# Patient Record
Sex: FEMALE | Race: WHITE | Employment: STUDENT | URBAN - NONMETROPOLITAN AREA
[De-identification: names, ages, dates, MRNs, and addresses within clinical notes are randomized per-mention and may not be internally consistent; named-entity substitution may affect disease eponyms.]

---
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
## 2024-01-16 ENCOUNTER — OFFICE VISIT (OUTPATIENT)
Age: 19
End: 2024-01-16

## 2024-01-16 VITALS
SYSTOLIC BLOOD PRESSURE: 127 MMHG | WEIGHT: 214 LBS | DIASTOLIC BLOOD PRESSURE: 82 MMHG | HEART RATE: 81 BPM | TEMPERATURE: 98.4 F | OXYGEN SATURATION: 98 %

## 2024-01-16 DIAGNOSIS — H65.112 ACUTE ALLERGIC SEROUS OTITIS MEDIA OF LEFT EAR: Primary | ICD-10-CM

## 2024-01-16 PROCEDURE — 99999 PR OFFICE/OUTPT VISIT,PROCEDURE ONLY: CPT | Performed by: NURSE PRACTITIONER

## 2024-01-16 ASSESSMENT — ENCOUNTER SYMPTOMS
EYES NEGATIVE: 1
RESPIRATORY NEGATIVE: 1
GASTROINTESTINAL NEGATIVE: 1

## 2024-01-16 NOTE — PROGRESS NOTES
[facility] PHYSICIANS [facility]  [address] [id number]  Dept: [phone]  Dept Fax: [phone]    Javier Madrid is a 18 y.o. femalewho presents to the [facility] today for c/o of Otalgia (Left/)      HPI:     18-year-old female presents to [facility] clinic today with complaints of left ear pain, ringing in left ear for 3 days.  States it comes and goes.  Cold weather makes it worse.  She denies fever.  Denies trauma to the left ear.  Denies drainage.  She reports having seasonal allergies.  She states she is taking a daily allergy medication.        No past medical history on file.    Current Outpatient Medications   Medication Sig Dispense Refill    cetirizine (ZYRTEC ALLERGY) 10 MG tablet daily      albuterol sulfate HFA (PROVENTIL;VENTOLIN;PROAIR) 108 (90 Base) MCG/ACT inhaler INHALE 1-2 puffs EVERY 4 TO 6 HOURS AS NEEDED      LO-ZUMANDIMINE 3-0.02 MG per tablet Take 1 tablet by mouth daily      FLUoxetine (PROZAC) 20 MG capsule Take 1 capsule by mouth daily      VITAMIN D PO Take by mouth daily       No current facility-administered medications for this visit.     No Known Allergies    :     Review of Systems   Constitutional: Negative.  Negative for chills and fever.   HENT:  Positive for ear pain. Negative for ear discharge.    Eyes: Negative.    Respiratory: Negative.     Cardiovascular: Negative.    Gastrointestinal: Negative.    Genitourinary: Negative.    Musculoskeletal: Negative.    Skin: Negative.    Neurological: Negative.    Psychiatric/Behavioral: Negative.         :     Physical Exam  Vitals and nursing note reviewed.   Constitutional:       General: She is not in acute distress.     Appearance: She is well-developed. She is not ill-appearing or toxic-appearing.   HENT:      Head: Normocephalic and atraumatic.      Right Ear: Tympanic membrane, ear canal and external

## 2024-02-27 ENCOUNTER — OFFICE VISIT (OUTPATIENT)
Age: 19
End: 2024-02-27

## 2024-02-27 VITALS
WEIGHT: 218 LBS | HEART RATE: 95 BPM | SYSTOLIC BLOOD PRESSURE: 130 MMHG | DIASTOLIC BLOOD PRESSURE: 87 MMHG | OXYGEN SATURATION: 99 % | TEMPERATURE: 99 F

## 2024-02-27 DIAGNOSIS — R68.83 CHILLS: Primary | ICD-10-CM

## 2024-02-27 DIAGNOSIS — J30.2 SEASONAL ALLERGIES: ICD-10-CM

## 2024-02-27 LAB
INFLUENZA A ANTIBODY: NEGATIVE
INFLUENZA B ANTIBODY: NEGATIVE
SARS-COV-2: NEGATIVE

## 2024-02-27 ASSESSMENT — ENCOUNTER SYMPTOMS
SORE THROAT: 0
COUGH: 0
SHORTNESS OF BREATH: 0
NAUSEA: 1
CHEST TIGHTNESS: 0
WHEEZING: 0

## 2024-02-27 NOTE — PROGRESS NOTES
[facility] PHYSICIANS [facility]  [address] [address]  Dept: [phone]  Dept Fax: [phone]    Javier Madrid is a 18 y.o. femalewho presents to the [facility] today for c/o of Chills      HPI:     18-year-old female presents to [facility] today with complaints of 1 day of chills, hot flashes, headache, sneezing, feeling tired, nausea.  She states she had taken Tylenol for headache with relief.  She denies known sick contacts.  She denies neck pain, ear pain, sore throat, vomiting or diarrhea.  Denies rash.        No past medical history on file.    Current Outpatient Medications   Medication Sig Dispense Refill    cetirizine (ZYRTEC ALLERGY) 10 MG tablet daily      LO-ZUMANDIMINE 3-0.02 MG per tablet Take 1 tablet by mouth daily      FLUoxetine (PROZAC) 20 MG capsule Take 1 capsule by mouth daily      albuterol sulfate HFA (PROVENTIL;VENTOLIN;PROAIR) 108 (90 Base) MCG/ACT inhaler INHALE 1-2 puffs EVERY 4 TO 6 HOURS AS NEEDED (Patient not taking: Reported on 2/27/2024)      VITAMIN D PO Take by mouth daily       No current facility-administered medications for this visit.     No Known Allergies    :     Review of Systems   Constitutional:  Positive for chills. Negative for activity change and fever.   HENT:  Positive for sneezing. Negative for ear pain and sore throat.    Respiratory:  Negative for cough, chest tightness, shortness of breath and wheezing.    Cardiovascular:  Negative for chest pain.   Gastrointestinal:  Positive for nausea.   Skin:  Negative for rash.   Neurological:  Positive for headaches.       :     Physical Exam  Vitals and nursing note reviewed.   Constitutional:       General: She is not in acute distress.     Appearance: She is not ill-appearing, toxic-appearing or diaphoretic.   HENT:      Head: Normocephalic.      Right Ear: Tympanic membrane normal.

## 2024-10-10 ENCOUNTER — HOSPITAL ENCOUNTER (EMERGENCY)
Age: 19
Discharge: HOME | End: 2024-10-11
Attending: EMERGENCY MEDICINE
Payer: COMMERCIAL

## 2024-10-10 VITALS
RESPIRATION RATE: 22 BRPM | BODY MASS INDEX: 37.56 KG/M2 | WEIGHT: 220 LBS | OXYGEN SATURATION: 99 % | HEART RATE: 82 BPM | DIASTOLIC BLOOD PRESSURE: 97 MMHG | SYSTOLIC BLOOD PRESSURE: 157 MMHG | TEMPERATURE: 98.1 F | HEIGHT: 64 IN

## 2024-10-10 DIAGNOSIS — T78.3XXA ANGIOEDEMA, INITIAL ENCOUNTER: Primary | ICD-10-CM

## 2024-10-10 PROCEDURE — 6370000000 HC RX 637 (ALT 250 FOR IP): Performed by: EMERGENCY MEDICINE

## 2024-10-10 PROCEDURE — 99283 EMERGENCY DEPT VISIT LOW MDM: CPT

## 2024-10-10 RX ORDER — PREDNISONE 20 MG/1
40 TABLET ORAL DAILY
Qty: 6 TABLET | Refills: 0 | Status: SHIPPED | OUTPATIENT
Start: 2024-10-10 | End: 2024-10-13

## 2024-10-10 RX ORDER — FAMOTIDINE 20 MG/1
20 TABLET, FILM COATED ORAL ONCE
Status: COMPLETED | OUTPATIENT
Start: 2024-10-10 | End: 2024-10-10

## 2024-10-10 RX ORDER — PREDNISONE 20 MG/1
40 TABLET ORAL ONCE
Status: COMPLETED | OUTPATIENT
Start: 2024-10-10 | End: 2024-10-10

## 2024-10-10 RX ORDER — FAMOTIDINE 20 MG/1
20 TABLET, FILM COATED ORAL 2 TIMES DAILY
Qty: 6 TABLET | Refills: 0 | Status: SHIPPED | OUTPATIENT
Start: 2024-10-10 | End: 2024-10-13

## 2024-10-10 RX ADMIN — PREDNISONE 40 MG: 20 TABLET ORAL at 23:15

## 2024-10-10 RX ADMIN — FAMOTIDINE 20 MG: 20 TABLET, FILM COATED ORAL at 23:16

## 2024-10-10 ASSESSMENT — LIFESTYLE VARIABLES
HOW MANY STANDARD DRINKS CONTAINING ALCOHOL DO YOU HAVE ON A TYPICAL DAY: PATIENT DOES NOT DRINK
HOW OFTEN DO YOU HAVE A DRINK CONTAINING ALCOHOL: NEVER

## 2024-10-10 ASSESSMENT — ENCOUNTER SYMPTOMS
VOMITING: 0
EYE DISCHARGE: 0
EYE PAIN: 0
COUGH: 0
FACIAL SWELLING: 1
CHEST TIGHTNESS: 0
SORE THROAT: 0
COLOR CHANGE: 0
BLOOD IN STOOL: 0
WHEEZING: 0
NAUSEA: 0
TROUBLE SWALLOWING: 0
BACK PAIN: 0
SHORTNESS OF BREATH: 0
SINUS PRESSURE: 0
RHINORRHEA: 0
ABDOMINAL PAIN: 0
DIARRHEA: 0
CONSTIPATION: 0
EYE REDNESS: 0

## 2024-10-10 ASSESSMENT — PAIN - FUNCTIONAL ASSESSMENT: PAIN_FUNCTIONAL_ASSESSMENT: NONE - DENIES PAIN

## 2024-10-11 NOTE — ED PROVIDER NOTES
REFERREDTO:  [facility] ED  [address] [address]  [phone]    If symptoms worsen      DISCHARGEMEDICATIONS:  New Prescriptions    FAMOTIDINE (PEPCID) 20 MG TABLET    Take 1 tablet by mouth 2 times daily for 3 days    PREDNISONE (DELTASONE) 20 MG TABLET    Take 2 tablets by mouth daily for 3 days       (Please note that portions of this note were completed with a voice recognition program.  Efforts were made to edit thedictations but occasionally words are mis-transcribed.)    Connor Kahn MD  Attending Emergency Physician                        Connor Kahn MD  10/10/24 8039

## 2024-11-05 ENCOUNTER — OFFICE VISIT (OUTPATIENT)
Age: 19
End: 2024-11-05

## 2024-11-05 VITALS — OXYGEN SATURATION: 98 % | DIASTOLIC BLOOD PRESSURE: 81 MMHG | SYSTOLIC BLOOD PRESSURE: 120 MMHG | TEMPERATURE: 98.7 F

## 2024-11-05 DIAGNOSIS — J06.9 UPPER RESPIRATORY TRACT INFECTION, UNSPECIFIED TYPE: ICD-10-CM

## 2024-11-05 DIAGNOSIS — R09.81 NASAL CONGESTION: Primary | ICD-10-CM

## 2024-11-05 LAB
INFLUENZA A ANTIBODY: NEGATIVE
INFLUENZA B ANTIBODY: NEGATIVE
SARS-COV-2: NEGATIVE

## 2024-11-05 ASSESSMENT — ENCOUNTER SYMPTOMS
NAUSEA: 1
SORE THROAT: 0
RHINORRHEA: 1
VOMITING: 0
EYES NEGATIVE: 1
RESPIRATORY NEGATIVE: 1

## 2024-11-05 NOTE — PROGRESS NOTES
nerve deficit.      Motor: No abnormal muscle tone.      Coordination: Coordination normal.   Psychiatric:         Behavior: Behavior normal.         Thought Content: Thought content normal.         Judgment: Judgment normal.       /81 (Site: Right Upper Arm, Position: Sitting)   Temp 98.7 °F (37.1 °C)   SpO2 98%     :      Diagnosis Orders   1. Nasal congestion  COVID-19    POCT Influenza A/B      2. Upper respiratory tract infection, unspecified type          Discussed with patient COVID-negative.  Influenza negative.  Symptoms more than likely viral in nature.  No signs of a bacterial infection today in clinic that would indicate the need for an antibiotic.  She can take dose appropriate  Sudafed 12HR 120mg twice daily (nasal decongestant)  Flonase 1 spray each nostril twice daily (nasal steroid)  Zyrtec 10mg Daily OR Claritin 10mg Daily (antihistamine)  Ibuprofen 3 times a day as needed (antiinflammatory)  Warm tea with 1tbsp honey (soothes the throat)  Increase water intake  Rest     If symptoms persist or worsen contact office or go to the emergency room for evaluation.   :      Return if symptoms worsen or fail to improve.    No orders of the defined types were placed in this encounter.       Javier received patient educational materials - see patient instructions.  Discussed use,benefit, and side effects of prescribed medications.  Treatment plan discussed atvisit. Continue routine health care follow up.     All patient questions answered.Pt voiced understanding.      Electronically signed by JIHAN Du CNP on 11/5/2024 at 11:52 AM

## 2025-03-13 ENCOUNTER — OFFICE VISIT (OUTPATIENT)
Age: 20
End: 2025-03-13

## 2025-03-13 VITALS
HEART RATE: 100 BPM | TEMPERATURE: 98.4 F | DIASTOLIC BLOOD PRESSURE: 82 MMHG | OXYGEN SATURATION: 98 % | SYSTOLIC BLOOD PRESSURE: 128 MMHG

## 2025-03-13 DIAGNOSIS — J02.9 PHARYNGITIS, UNSPECIFIED ETIOLOGY: ICD-10-CM

## 2025-03-13 DIAGNOSIS — J06.9 UPPER RESPIRATORY TRACT INFECTION, UNSPECIFIED TYPE: ICD-10-CM

## 2025-03-13 DIAGNOSIS — R09.81 NASAL CONGESTION: Primary | ICD-10-CM

## 2025-03-13 LAB
INFLUENZA A ANTIBODY: NEGATIVE
INFLUENZA B ANTIBODY: NEGATIVE
S PYO AG THROAT QL: ABNORMAL
SARS-COV-2: NEGATIVE

## 2025-03-13 ASSESSMENT — ENCOUNTER SYMPTOMS
SORE THROAT: 1
RESPIRATORY NEGATIVE: 1
GASTROINTESTINAL NEGATIVE: 1
RHINORRHEA: 1

## 2025-03-13 NOTE — PROGRESS NOTES
[facility] PHYSICIANS [facility]  [address] [id number]  Dept: [phone]  Dept Fax: [phone]    Javier Madrid is a 19 y.o. femalewho presents to the [facility] today for c/o of Nasal Congestion and Pharyngitis      HPI:     19-year-old female presents to [facility] clinic today with complaints of 4 days of runny nose, nasal congestion, sore throat and a feeling of fluid present ears.  She denies known fever.  She denies known sick contacts.  She denies difficulty swallowing.  She denies chest pain, shortness of breath, difficulty breathing or wheezing.  Denies abdominal pain.        No past medical history on file.    Current Outpatient Medications   Medication Sig Dispense Refill    fluticasone (VERAMYST) 27.5 MCG/SPRAY nasal spray 2 sprays by Each Nostril route daily      levocetirizine (XYZAL) 5 MG tablet Take 1 tablet by mouth nightly      albuterol sulfate HFA (PROVENTIL;VENTOLIN;PROAIR) 108 (90 Base) MCG/ACT inhaler       famotidine (PEPCID) 20 MG tablet Take 1 tablet by mouth 2 times daily for 3 days 6 tablet 0    amphetamine-dextroamphetamine (ADDERALL) 20 MG tablet Take 1 tablet by mouth daily. (Patient not taking: Reported on 3/13/2025)      ferrous sulfate (IRON 325) 325 (65 Fe) MG tablet Take 1 tablet by mouth daily (with breakfast) (Patient not taking: Reported on 3/13/2025)      cetirizine (ZYRTEC ALLERGY) 10 MG tablet daily (Patient not taking: Reported on 3/13/2025)      LO-ZUMANDIMINE 3-0.02 MG per tablet Take 1 tablet by mouth daily (Patient not taking: Reported on 3/13/2025)      FLUoxetine (PROZAC) 20 MG capsule Take 1 capsule by mouth daily (Patient not taking: Reported on 3/13/2025)      VITAMIN D PO Take by mouth daily (Patient not taking: Reported on 3/13/2025)       No current facility-administered medications for this visit.     Allergies   Allergen Reactions

## 2025-03-31 ENCOUNTER — HOSPITAL ENCOUNTER (OUTPATIENT)
Age: 20
Discharge: HOME | End: 2025-03-31
Payer: COMMERCIAL

## 2025-03-31 ENCOUNTER — RESULTS FOLLOW-UP (OUTPATIENT)
Age: 20
End: 2025-03-31

## 2025-03-31 ENCOUNTER — OFFICE VISIT (OUTPATIENT)
Age: 20
End: 2025-03-31

## 2025-03-31 VITALS
HEART RATE: 90 BPM | SYSTOLIC BLOOD PRESSURE: 114 MMHG | TEMPERATURE: 97.9 F | OXYGEN SATURATION: 98 % | DIASTOLIC BLOOD PRESSURE: 95 MMHG

## 2025-03-31 DIAGNOSIS — R52 BODY ACHES: Primary | ICD-10-CM

## 2025-03-31 DIAGNOSIS — R52 BODY ACHES: ICD-10-CM

## 2025-03-31 LAB
HETEROPH AB BLD QL IA: NEGATIVE
INFLUENZA A ANTIBODY: NEGATIVE
INFLUENZA B ANTIBODY: NEGATIVE
S PYO AG THROAT QL: NORMAL

## 2025-03-31 PROCEDURE — 36415 COLL VENOUS BLD VENIPUNCTURE: CPT

## 2025-03-31 PROCEDURE — 86308 HETEROPHILE ANTIBODY SCREEN: CPT

## 2025-03-31 ASSESSMENT — ENCOUNTER SYMPTOMS
GASTROINTESTINAL NEGATIVE: 1
EYES NEGATIVE: 1
RESPIRATORY NEGATIVE: 1

## 2025-03-31 NOTE — PROGRESS NOTES
[facility] PHYSICIANS [facility]  [address] [id number]  Dept: [phone]  Dept Fax: [phone]    Javier Madrid is a 19 y.o. femalewho presents to the [facility] today for c/o of Generalized Body Aches    HPI:     19-year-old female presents to [facility] clinic today with complaints of generalized bodyaches for approximately 5 days.  She reports she is feeling better today. She denies fever.  She denies known sick contacts.  She denies sore throat.  States she has taken Tylenol with some.  She reports she is eating and drinking well.  She denies swelling today.  She denies rashes.  She denies any changes in her routine or different exercises.  She denies swollen joints.  Denies abdominal pain or dysuria.  Denies possibility of pregnancy.  Reports she has not attempted sexual intercourse.      No past medical history on file.    Current Outpatient Medications   Medication Sig Dispense Refill    fluticasone (VERAMYST) 27.5 MCG/SPRAY nasal spray 2 sprays by Each Nostril route daily      levocetirizine (XYZAL) 5 MG tablet Take 1 tablet by mouth nightly      ferrous sulfate (IRON 325) 325 (65 Fe) MG tablet Take 1 tablet by mouth daily (with breakfast)      FLUoxetine (PROZAC) 20 MG capsule Take 1 capsule by mouth daily      VITAMIN D PO Take by mouth daily      famotidine (PEPCID) 20 MG tablet Take 1 tablet by mouth 2 times daily for 3 days 6 tablet 0    amphetamine-dextroamphetamine (ADDERALL) 20 MG tablet Take 1 tablet by mouth daily. (Patient not taking: Reported on 3/31/2025)      cetirizine (ZYRTEC ALLERGY) 10 MG tablet daily (Patient not taking: Reported on 3/31/2025)      albuterol sulfate HFA (PROVENTIL;VENTOLIN;PROAIR) 108 (90 Base) MCG/ACT inhaler  (Patient not taking: Reported on 3/31/2025)      LO-ZUMANDIMINE 3-0.02 MG per tablet Take 1 tablet by mouth daily (Patient not taking: